# Patient Record
Sex: FEMALE | Race: WHITE | NOT HISPANIC OR LATINO | Employment: UNEMPLOYED | URBAN - METROPOLITAN AREA
[De-identification: names, ages, dates, MRNs, and addresses within clinical notes are randomized per-mention and may not be internally consistent; named-entity substitution may affect disease eponyms.]

---

## 2024-02-22 ENCOUNTER — OFFICE VISIT (OUTPATIENT)
Dept: URGENT CARE | Facility: CLINIC | Age: 49
End: 2024-02-22
Payer: MEDICAID

## 2024-02-22 VITALS
OXYGEN SATURATION: 98 % | TEMPERATURE: 99 F | RESPIRATION RATE: 18 BRPM | HEIGHT: 65 IN | DIASTOLIC BLOOD PRESSURE: 111 MMHG | SYSTOLIC BLOOD PRESSURE: 177 MMHG | WEIGHT: 150 LBS | BODY MASS INDEX: 24.99 KG/M2 | HEART RATE: 84 BPM

## 2024-02-22 DIAGNOSIS — F17.200 NEEDS SMOKING CESSATION EDUCATION: ICD-10-CM

## 2024-02-22 DIAGNOSIS — R21 RASH AND NONSPECIFIC SKIN ERUPTION: Primary | ICD-10-CM

## 2024-02-22 DIAGNOSIS — Z86.39 HISTORY OF DIABETES MELLITUS: ICD-10-CM

## 2024-02-22 LAB — GLUCOSE SERPL-MCNC: 76 MG/DL (ref 70–110)

## 2024-02-22 PROCEDURE — 82962 GLUCOSE BLOOD TEST: CPT | Mod: S$GLB,,, | Performed by: PHYSICIAN ASSISTANT

## 2024-02-22 PROCEDURE — 99203 OFFICE O/P NEW LOW 30 MIN: CPT | Mod: S$GLB,,, | Performed by: PHYSICIAN ASSISTANT

## 2024-02-22 RX ORDER — PREDNISONE 20 MG/1
20 TABLET ORAL DAILY
Qty: 5 TABLET | Refills: 0 | Status: SHIPPED | OUTPATIENT
Start: 2024-02-22 | End: 2024-02-27

## 2024-02-22 RX ORDER — TRIAMCINOLONE ACETONIDE 1 MG/G
CREAM TOPICAL 2 TIMES DAILY
Qty: 30 G | Refills: 0 | Status: SHIPPED | OUTPATIENT
Start: 2024-02-22
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
## 2024-03-20 PROBLEM — F32.9 REACTIVE DEPRESSION: Status: ACTIVE | Noted: 2024-03-20

## 2024-03-20 PROBLEM — J30.1 SEASONAL ALLERGIC RHINITIS DUE TO POLLEN: Status: ACTIVE | Noted: 2024-03-20

## 2024-03-20 PROBLEM — I10 ESSENTIAL HYPERTENSION: Status: ACTIVE | Noted: 2024-03-20

## 2024-03-20 PROBLEM — F51.01 PRIMARY INSOMNIA: Status: ACTIVE | Noted: 2024-03-20

## 2024-03-20 PROBLEM — Z79.4 UNCONTROLLED TYPE 2 DIABETES MELLITUS WITH HYPERGLYCEMIA, WITH LONG-TERM CURRENT USE OF INSULIN: Status: ACTIVE | Noted: 2024-03-20

## 2024-03-20 PROBLEM — F43.10 PTSD (POST-TRAUMATIC STRESS DISORDER): Status: ACTIVE | Noted: 2024-03-20

## 2024-03-20 PROBLEM — J44.89 ASTHMA-COPD OVERLAP SYNDROME: Status: ACTIVE | Noted: 2024-03-20

## 2024-03-20 PROBLEM — E11.65 UNCONTROLLED TYPE 2 DIABETES MELLITUS WITH HYPERGLYCEMIA, WITH LONG-TERM CURRENT USE OF INSULIN: Status: ACTIVE | Noted: 2024-03-20

## 2024-03-20 PROBLEM — G89.29 CHRONIC PAIN OF RIGHT KNEE: Status: ACTIVE | Noted: 2024-03-20

## 2024-03-20 PROBLEM — M25.561 CHRONIC PAIN OF RIGHT KNEE: Status: ACTIVE | Noted: 2024-03-20

## 2024-03-20 PROBLEM — Z87.891 PERSONAL HISTORY OF NICOTINE DEPENDENCE: Status: ACTIVE | Noted: 2024-03-20

## 2024-03-29 ENCOUNTER — OFFICE VISIT (OUTPATIENT)
Dept: URGENT CARE | Facility: CLINIC | Age: 49
End: 2024-03-29
Payer: MEDICAID

## 2024-03-29 VITALS
DIASTOLIC BLOOD PRESSURE: 94 MMHG | RESPIRATION RATE: 19 BRPM | OXYGEN SATURATION: 97 % | HEIGHT: 65 IN | TEMPERATURE: 99 F | SYSTOLIC BLOOD PRESSURE: 165 MMHG | HEART RATE: 86 BPM | WEIGHT: 155 LBS | BODY MASS INDEX: 25.83 KG/M2

## 2024-03-29 DIAGNOSIS — H00.012 HORDEOLUM EXTERNUM OF RIGHT LOWER EYELID: Primary | ICD-10-CM

## 2024-03-29 PROCEDURE — 99204 OFFICE O/P NEW MOD 45 MIN: CPT | Mod: S$GLB,,, | Performed by: NURSE PRACTITIONER

## 2024-03-29 RX ORDER — ERYTHROMYCIN 5 MG/G
OINTMENT OPHTHALMIC 3 TIMES DAILY
Qty: 1 G | Refills: 0 | Status: SHIPPED | OUTPATIENT
Start: 2024-03-29 | End: 2024-04-03

## 2024-03-29 RX ORDER — SULFAMETHOXAZOLE AND TRIMETHOPRIM 800; 160 MG/1; MG/1
1 TABLET ORAL 2 TIMES DAILY
Qty: 10 TABLET | Refills: 0 | Status: SHIPPED | OUTPATIENT
Start: 2024-03-29 | End: 2024-04-03

## 2024-03-29 NOTE — PROGRESS NOTES
"Subjective:      Patient ID: Christine Sanchez is a 48 y.o. female.    Vitals:  height is 5' 5" (1.651 m) and weight is 70.3 kg (155 lb). Her oral temperature is 98.6 °F (37 °C). Her blood pressure is 165/94 (abnormal) and her pulse is 86. Her respiration is 19 and oxygen saturation is 97%.     Chief Complaint: Eye Problem    Pt is coming in for a possible stye on her eye that began yesterday morning.    Eye Problem   The right eye is affected. This is a new problem. The current episode started yesterday. The problem occurs constantly. The problem has been unchanged. There was no injury mechanism. The pain is at a severity of 7/10. The pain is moderate. There is No known exposure to pink eye. She Does not wear contacts. Associated symptoms include eye redness and itching. Pertinent negatives include no blurred vision, double vision or vomiting. She has tried nothing for the symptoms. The treatment provided no relief.       Eyes:  Positive for eye itching and eye redness. Negative for double vision and blurred vision.   Gastrointestinal:  Negative for vomiting.      Objective:     Physical Exam   Constitutional:  Non-toxic appearance. She does not appear ill. No distress. normal  HENT:   Head: Normocephalic.   Nose: Nose normal.   Mouth/Throat: Mucous membranes are moist. Oropharynx is clear.   Eyes: Right eye exhibits hordeolum. Right eye exhibits no exudate.       Cardiovascular: Normal rate.   Abdominal: Normal appearance.   Neurological: She is alert.   Nursing note and vitals reviewed.      Assessment:     No diagnosis found.    Plan:       There are no diagnoses linked to this encounter.                "

## 2024-03-29 NOTE — PATIENT INSTRUCTIONS
Take medications as directed  Apply warm compresses to right eye  Go to the emergency department for increased pain, redness or swelling  Strict follow up with eye doctor in 2-3 days

## 2024-06-05 PROBLEM — R76.8 HEPATITIS C ANTIBODY POSITIVE IN BLOOD: Status: ACTIVE | Noted: 2024-06-05

## 2024-06-05 PROBLEM — E11.9 CONTROLLED TYPE 2 DIABETES MELLITUS WITHOUT COMPLICATION, WITHOUT LONG-TERM CURRENT USE OF INSULIN: Status: ACTIVE | Noted: 2024-03-20

## 2024-07-08 ENCOUNTER — PATIENT OUTREACH (OUTPATIENT)
Dept: ADMINISTRATIVE | Facility: HOSPITAL | Age: 49
End: 2024-07-08
Payer: MEDICAID

## 2024-09-30 ENCOUNTER — PATIENT OUTREACH (OUTPATIENT)
Dept: ADMINISTRATIVE | Facility: HOSPITAL | Age: 49
End: 2024-09-30
Payer: MEDICAID

## 2024-09-30 NOTE — PROGRESS NOTES
Attempted to contact pt in reference to overdue cervical cancer screening. Unable to contact. Not a working number

## 2025-05-14 DIAGNOSIS — E11.9 TYPE 2 DIABETES MELLITUS WITHOUT COMPLICATION: ICD-10-CM

## 2025-08-08 DIAGNOSIS — E11.9 TYPE 2 DIABETES MELLITUS WITHOUT COMPLICATION: ICD-10-CM
